# Patient Record
Sex: MALE | Race: WHITE | NOT HISPANIC OR LATINO | Employment: FULL TIME | URBAN - NONMETROPOLITAN AREA
[De-identification: names, ages, dates, MRNs, and addresses within clinical notes are randomized per-mention and may not be internally consistent; named-entity substitution may affect disease eponyms.]

---

## 2023-06-13 ENCOUNTER — OFFICE VISIT (OUTPATIENT)
Dept: URGENT CARE | Facility: CLINIC | Age: 58
End: 2023-06-13
Payer: COMMERCIAL

## 2023-06-13 VITALS
RESPIRATION RATE: 18 BRPM | HEART RATE: 98 BPM | BODY MASS INDEX: 34.82 KG/M2 | HEIGHT: 65 IN | DIASTOLIC BLOOD PRESSURE: 96 MMHG | WEIGHT: 209 LBS | SYSTOLIC BLOOD PRESSURE: 136 MMHG | TEMPERATURE: 98.6 F | OXYGEN SATURATION: 96 %

## 2023-06-13 DIAGNOSIS — R03.0 ELEVATED BLOOD PRESSURE READING: ICD-10-CM

## 2023-06-13 DIAGNOSIS — H10.13 ALLERGIC CONJUNCTIVITIS OF BOTH EYES: ICD-10-CM

## 2023-06-13 PROCEDURE — 3075F SYST BP GE 130 - 139MM HG: CPT | Performed by: PHYSICIAN ASSISTANT

## 2023-06-13 PROCEDURE — 3080F DIAST BP >= 90 MM HG: CPT | Performed by: PHYSICIAN ASSISTANT

## 2023-06-13 PROCEDURE — 99203 OFFICE O/P NEW LOW 30 MIN: CPT | Performed by: PHYSICIAN ASSISTANT

## 2023-06-13 RX ORDER — FLUTICASONE PROPIONATE 50 MCG
1 SPRAY, SUSPENSION (ML) NASAL DAILY
Qty: 16 G | Refills: 0 | Status: SHIPPED | OUTPATIENT
Start: 2023-06-13

## 2023-06-13 RX ORDER — CETIRIZINE HYDROCHLORIDE 10 MG/1
10 TABLET ORAL DAILY
Qty: 30 TABLET | Refills: 0 | Status: SHIPPED | OUTPATIENT
Start: 2023-06-13

## 2023-06-13 RX ORDER — OLOPATADINE HYDROCHLORIDE 1 MG/ML
1 SOLUTION/ DROPS OPHTHALMIC 2 TIMES DAILY
Qty: 5 ML | Refills: 1 | Status: SHIPPED | OUTPATIENT
Start: 2023-06-13 | End: 2023-08-30

## 2023-06-13 NOTE — PROGRESS NOTES
"Subjective:   Get Schilling is a 58 y.o. male who presents for Seasonal Allergies (Allergies x 1 month)     Patient presents with chief complaint of severe seasonal allergies with chief complaint of extremely watery and itchy eyes right greater than left.  He states he has similar symptoms annually around this time.  He also reports an allergy to dogs and pollen.  The itching of his right eye has become so severe he has noticed some redness to the upper eyelid.  He denies visual acuity changes.  He has taken occasional Benadryl but no other  allergy medications.  He denies shortness of breath or difficulty breathing.  Does report associated rhinitis.  No fevers or chills.        Medications:  This patient does not have an active medication from one of the medication groupers.    Allergies:             Erythromycin    Surgical History:         Past Surgical History:   Procedure Laterality Date    ORIF, FRACTURE, TIBIA  7/5/2014    Performed by Yuri Pearson M.D. at [facility]       Past Social Hx:  Get Schilling  reports that he has quit smoking. His smoking use included cigarettes. He smoked an average of .5 packs per day. He has never used smokeless tobacco. He reports current alcohol use of about 10.0 oz of alcohol per week. He reports that he does not use drugs.     Past Family Hx:   Get Schilling family history includes Heart Disease in his mother.       Problem list, medications, and allergies reviewed by myself today in Epic.     Objective:     BP (!) 136/96 (BP Location: Right arm, Patient Position: Sitting, BP Cuff Size: Adult)   Pulse 98   Temp 37 °C (98.6 °F) (Temporal)   Resp 18   Ht 1.651 m (5' 5\")   Wt 94.8 kg (209 lb)   SpO2 96%   BMI 34.78 kg/m²     Physical Exam  Vitals and nursing note reviewed.   Constitutional:       General: He is not in acute distress.     Appearance: Normal appearance. He is not ill-appearing or toxic-appearing.   HENT:      Head: Normocephalic.     "  Right Ear: Tympanic membrane has decreased mobility.      Left Ear: Tympanic membrane has decreased mobility.      Nose: Mucosal edema, congestion and rhinorrhea present.      Right Turbinates: Swollen and pale.      Left Turbinates: Swollen and pale.      Mouth/Throat:      Mouth: Mucous membranes are moist.      Pharynx: Posterior oropharyngeal erythema present. No oropharyngeal exudate.   Eyes:      General:         Right eye: No discharge or hordeolum.         Left eye: No discharge or hordeolum.      Extraocular Movements:      Right eye: Normal extraocular motion.      Conjunctiva/sclera:      Right eye: Right conjunctiva is injected. Chemosis present. No exudate or hemorrhage.     Left eye: Left conjunctiva is injected. No exudate or hemorrhage.     Pupils: Pupils are equal, round, and reactive to light.        Comments: Conjunctival injection and mild chemosis right greater than left.  There is significant erythema to the right upper eyelid, no chalazion or hordeolum.  No exudate noted.  Diffuse watering from eyes.   Cardiovascular:      Rate and Rhythm: Normal rate.      Pulses: Normal pulses.      Heart sounds: Normal heart sounds.   Pulmonary:      Effort: Pulmonary effort is normal. No respiratory distress.      Breath sounds: Normal breath sounds. No stridor. No wheezing, rhonchi or rales.   Skin:     General: Skin is warm.      Findings: No rash.   Neurological:      Mental Status: He is alert.         Assessment/Plan:     Diagnosis and Associated Orders:     1. Allergic conjunctivitis of both eyes  - olopatadine (PATANOL) 0.1 % ophthalmic solution; Administer 1 Drop into both eyes 2 times a day. For allergic conjunctivitis  Dispense: 5 mL; Refill: 1  - naphazoline-pheniramine (NAPHCON-A) 0.025-0.3 % ophthalmic solution; Administer 1 Drop into both eyes 4 times a day. Do not use longer than 2 weeks  Dispense: 15 mL; Refill: 0  - cetirizine (ZYRTEC ALLERGY) 10 MG Tab; Take 1 Tablet by mouth every day.   Dispense: 30 Tablet; Refill: 0  - fluticasone (FLONASE) 50 MCG/ACT nasal spray; Administer 1 Spray into affected nostril(S) every day.  Dispense: 16 g; Refill: 0  - hydrocortisone 0.5 % Cream; Apply twice/day no more than 2 weeks  Dispense: 15 g; Refill: 0    2. Elevated blood pressure reading        Comments/MDM:  Patient presents with allergic conjunctivitis.  Recommend nonsedating antihistamine and Flonase, neither of which he is using currently.  Recommend initiation of Patanol drops with short-term use of Naphcon-A.  May discontinue Naphcon-A within approximately 2 weeks.  Patanol will take slightly longer to be effective.  May use half percent hydrocortisone cream to eyelids no more than 2 weeks.  Monitor for signs of skin thinning or atrophy.  Ultimately if this is ineffective in treating him he can follow-up with his primary care physician.  He was requesting an injection of steroid however he really has not tried any other allergy symptom relief so I do believe this is a proper place to start.    Your blood pressure was elevated today urgent care.  Please measure and record your blood pressure 3 times over 2 weeks and keep notes.  If this is the first time this may or may not be cause for alarm.  A one-time elevated blood pressure reading does not mean that you need treatment.  The diagnosis of high blood pressure requires 2 separate blood pressure measurements above 140/90 on different days.  Your blood pressure should be checked when you are relaxed and have been sitting for about 10 minutes.  Injury, illness, emotional stress, caffeine, and some medicine such as decongestants may elevate blood pressure temporarily.  Treatment for hypertension includes both lifestyle changes and medications.  Weight loss and reducing dietary salt is beneficial.  Smoking cessation and exercise are helpful.  Avoid decongestants and recreational drugs that cause hypertension.  Do not drink alcohol in excess.  Seek immediate  medical care if you develop a severe headache, blurred or change in vision, confusion, unusual weakness or numbness, severe chest or abdominal pain, vomiting, breathing problems.    I personally reviewed prior external notes and test results pertinent to today's visit. Supportive care, natural history, differential diagnoses, and indications for immediate follow-up discussed. Return to clinic or go to ED if symptoms worsen or persist.  Red flag symptoms discussed.  Patient/Parent/Guardian voices understanding. Follow-up with your primary care provider in 3-5 days.  All side effects of medication discussed including allergic response, GI upset, tendon injury, rash, sedation etc    Please note that this dictation was created using voice recognition software. I have made a reasonable attempt to correct obvious errors, but I expect that there are errors of grammar and possibly content that I did not discover before finalizing the note.    This note was electronically signed by Juani De Paz PA-C

## 2023-08-30 ENCOUNTER — OFFICE VISIT (OUTPATIENT)
Dept: URGENT CARE | Facility: CLINIC | Age: 58
End: 2023-08-30
Payer: COMMERCIAL

## 2023-08-30 VITALS
SYSTOLIC BLOOD PRESSURE: 126 MMHG | RESPIRATION RATE: 16 BRPM | HEART RATE: 86 BPM | WEIGHT: 205 LBS | HEIGHT: 65 IN | OXYGEN SATURATION: 98 % | DIASTOLIC BLOOD PRESSURE: 84 MMHG | BODY MASS INDEX: 34.16 KG/M2 | TEMPERATURE: 98.6 F

## 2023-08-30 DIAGNOSIS — H57.89 IRRITATION OF BOTH EYES: ICD-10-CM

## 2023-08-30 DIAGNOSIS — H10.13 ALLERGIC CONJUNCTIVITIS OF BOTH EYES: ICD-10-CM

## 2023-08-30 DIAGNOSIS — J30.89 SEASONAL AND PERENNIAL ALLERGIC RHINITIS: ICD-10-CM

## 2023-08-30 DIAGNOSIS — J30.2 SEASONAL AND PERENNIAL ALLERGIC RHINITIS: ICD-10-CM

## 2023-08-30 PROCEDURE — 3079F DIAST BP 80-89 MM HG: CPT | Performed by: NURSE PRACTITIONER

## 2023-08-30 PROCEDURE — 99214 OFFICE O/P EST MOD 30 MIN: CPT | Performed by: NURSE PRACTITIONER

## 2023-08-30 PROCEDURE — 3074F SYST BP LT 130 MM HG: CPT | Performed by: NURSE PRACTITIONER

## 2023-08-30 RX ORDER — TRIAMCINOLONE ACETONIDE 40 MG/ML
40 INJECTION, SUSPENSION INTRA-ARTICULAR; INTRAMUSCULAR ONCE
Status: COMPLETED | OUTPATIENT
Start: 2023-08-30 | End: 2023-08-30

## 2023-08-30 RX ORDER — AZELASTINE HYDROCHLORIDE 0.5 MG/ML
1 SOLUTION/ DROPS OPHTHALMIC 2 TIMES DAILY
Qty: 6 ML | Refills: 0 | Status: SHIPPED | OUTPATIENT
Start: 2023-08-30

## 2023-08-30 RX ADMIN — TRIAMCINOLONE ACETONIDE 40 MG: 40 INJECTION, SUSPENSION INTRA-ARTICULAR; INTRAMUSCULAR at 15:51

## 2023-08-30 NOTE — PROGRESS NOTES
"Subjective:   Get Schilling is a pleasant 58 y.o. male who presents for Seasonal Allergies (Allergies )       HPI  Patient presents for evaluation of an approximate 3-month history of nasal congestion with rhinorrhea, itchy nose, itchy throat, and watery itchy eyes.  Patient was seen in urgent care 6/13 for same symptoms, at which time he began cetirizine, Patanol, with mild relief.  States that recent change in weather has made his allergy symptoms significantly worse.  Has had Kenalog injection in the past, tolerated it well.    ROS  All other systems are negative except as documented above within HPI.    MEDS:   Current Outpatient Medications:     olopatadine (PATANOL) 0.1 % ophthalmic solution, Administer 1 Drop into both eyes 2 times a day. For allergic conjunctivitis, Disp: 5 mL, Rfl: 1    naphazoline-pheniramine (NAPHCON-A) 0.025-0.3 % ophthalmic solution, Administer 1 Drop into both eyes 4 times a day. Do not use longer than 2 weeks, Disp: 15 mL, Rfl: 0    cetirizine (ZYRTEC ALLERGY) 10 MG Tab, Take 1 Tablet by mouth every day., Disp: 30 Tablet, Rfl: 0    fluticasone (FLONASE) 50 MCG/ACT nasal spray, Administer 1 Spray into affected nostril(S) every day., Disp: 16 g, Rfl: 0    hydrocortisone 0.5 % Cream, Apply twice/day no more than 2 weeks, Disp: 15 g, Rfl: 0  ALLERGIES:   Allergies   Allergen Reactions    Erythromycin Hives       Patient's PMH, SocHx, SurgHx, FamHx, Drug allergies and medications were reviewed.     Objective:   /84 (BP Location: Right arm, Patient Position: Sitting, BP Cuff Size: Adult)   Pulse 86   Temp 37 °C (98.6 °F) (Temporal)   Resp 16   Ht 1.651 m (5' 5\")   Wt 93 kg (205 lb)   SpO2 98%   BMI 34.11 kg/m²     Physical Exam  Vitals and nursing note reviewed.   Constitutional:       General: He is awake.      Appearance: Normal appearance. He is well-developed.   HENT:      Head: Normocephalic and atraumatic.      Right Ear: Tympanic membrane, ear canal and external ear " normal.      Left Ear: Tympanic membrane, ear canal and external ear normal.      Nose: Congestion and rhinorrhea present.      Mouth/Throat:      Mouth: Mucous membranes are moist.      Pharynx: Oropharynx is clear. Posterior oropharyngeal erythema present. No oropharyngeal exudate.   Eyes:      Extraocular Movements: Extraocular movements intact.      Conjunctiva/sclera: Conjunctivae normal.   Neck:      Trachea: Trachea and phonation normal.   Cardiovascular:      Rate and Rhythm: Normal rate and regular rhythm.      Pulses: Normal pulses.      Heart sounds: Normal heart sounds.   Pulmonary:      Effort: Pulmonary effort is normal.      Breath sounds: Normal breath sounds and air entry. No stridor. No wheezing, rhonchi or rales.   Musculoskeletal:         General: Normal range of motion.      Cervical back: Normal range of motion and neck supple.   Skin:     General: Skin is warm and dry.      Capillary Refill: Capillary refill takes less than 2 seconds.   Neurological:      Mental Status: He is alert and oriented to person, place, and time.   Psychiatric:         Mood and Affect: Mood normal.         Behavior: Behavior is cooperative.         Thought Content: Thought content normal.         Assessment/Plan:   Assessment    1. Seasonal and perennial allergic rhinitis  - triamcinolone acetonide (Kenalog-40) injection 40 mg  - azelastine (OPTIVAR) 0.05 % ophthalmic solution; Administer 1 Drop into both eyes 2 times a day.  Dispense: 6 mL; Refill: 0    2. Allergic conjunctivitis of both eyes    3. Irritation of both eyes      Vital signs stable at today's acute urgent care visit.  Kenalog given in clinic, patient tolerated well.  Begin medications as listed.    Advised the patient to follow-up with the primary care provider/urgent care if symptoms persist. All questions were encouraged and answered to the patient's satisfaction and understanding, and they agree to the plan of care.     This is an acute problem with  uncertain prognosis, medication management and instructions as well as management options were provided.  I personally reviewed prior external notes and test results pertinent to today and independently reviewed and interpreted all diagnostics, to include POC testing. Time spent evaluating this patient includes preparing for visit, counseling/education, exam, evaluation, obtaining history, and ordering lab/test/procedures.      Please note that this dictation was created using voice recognition software. I have made a reasonable attempt to correct obvious errors, but I expect that there are errors of grammar and possibly content that I did not discover before finalizing the note.

## 2024-05-15 ENCOUNTER — APPOINTMENT (OUTPATIENT)
Dept: URGENT CARE | Facility: CLINIC | Age: 59
End: 2024-05-15
Payer: COMMERCIAL

## 2024-05-16 ENCOUNTER — OFFICE VISIT (OUTPATIENT)
Dept: URGENT CARE | Facility: CLINIC | Age: 59
End: 2024-05-16
Payer: COMMERCIAL

## 2024-05-16 VITALS
SYSTOLIC BLOOD PRESSURE: 118 MMHG | HEART RATE: 60 BPM | OXYGEN SATURATION: 95 % | RESPIRATION RATE: 16 BRPM | WEIGHT: 195 LBS | DIASTOLIC BLOOD PRESSURE: 74 MMHG | HEIGHT: 65 IN | TEMPERATURE: 98.6 F | BODY MASS INDEX: 32.49 KG/M2

## 2024-05-16 DIAGNOSIS — J30.1 SEASONAL ALLERGIC RHINITIS DUE TO POLLEN: ICD-10-CM

## 2024-05-16 PROCEDURE — 99213 OFFICE O/P EST LOW 20 MIN: CPT | Performed by: NURSE PRACTITIONER

## 2024-05-16 PROCEDURE — 3074F SYST BP LT 130 MM HG: CPT | Performed by: NURSE PRACTITIONER

## 2024-05-16 PROCEDURE — 3078F DIAST BP <80 MM HG: CPT | Performed by: NURSE PRACTITIONER

## 2024-05-16 RX ORDER — TRIAMCINOLONE ACETONIDE 40 MG/ML
40 INJECTION, SUSPENSION INTRA-ARTICULAR; INTRAMUSCULAR ONCE
Status: COMPLETED | OUTPATIENT
Start: 2024-05-16 | End: 2024-05-16

## 2024-05-16 RX ADMIN — TRIAMCINOLONE ACETONIDE 40 MG: 40 INJECTION, SUSPENSION INTRA-ARTICULAR; INTRAMUSCULAR at 12:43

## 2024-05-16 NOTE — PROGRESS NOTES
Date: 05/16/24        Chief Complaint   Patient presents with    Other     Requesting a allergy shot         History of Present Illness: 58 y.o.  male presents to clinic with severe seasonal allergies.  Patient reports at season change he has significant allergic rhinitis and conjunctivitis secondary to pollen.  He has been taking daily Zyrtec with minimal improvement.  He also has significant redness and swelling around his eyes that he states is consistent with his previous allergic flares.  He denies any fevers or bodyaches.  He denies any vision changes or eye pain.      ROS:    No severe shortness of breath   No Cardiac like chest pain, as discussed   As otherwise stated in HPI    Medical/SX/ Social History:  Reviewed per chart    Pertinent Medications:    Current Outpatient Medications on File Prior to Visit   Medication Sig Dispense Refill    azelastine (OPTIVAR) 0.05 % ophthalmic solution Administer 1 Drop into both eyes 2 times a day. 6 mL 0    naphazoline-pheniramine (NAPHCON-A) 0.025-0.3 % ophthalmic solution Administer 1 Drop into both eyes 4 times a day. Do not use longer than 2 weeks 15 mL 0    cetirizine (ZYRTEC ALLERGY) 10 MG Tab Take 1 Tablet by mouth every day. 30 Tablet 0    fluticasone (FLONASE) 50 MCG/ACT nasal spray Administer 1 Spray into affected nostril(S) every day. 16 g 0    hydrocortisone 0.5 % Cream Apply twice/day no more than 2 weeks 15 g 0     No current facility-administered medications on file prior to visit.        Allergies:    Erythromycin     Problem list, medications, and allergies reviewed by myself today in Epic     Physical Exam:    Vitals:    05/16/24 1233   BP: 118/74   Pulse: (!) 120   Resp: 16   Temp: 37 °C (98.6 °F)   SpO2: 95%             Physical Exam  Constitutional:       General: He is awake.      Appearance: Normal appearance. He is not ill-appearing, toxic-appearing or diaphoretic.   HENT:      Head: Normocephalic and atraumatic.      Right Ear: Tympanic  membrane, ear canal and external ear normal.      Left Ear: Tympanic membrane, ear canal and external ear normal.      Nose: Mucosal edema and rhinorrhea present. Rhinorrhea is clear.      Right Turbinates: Swollen and pale.      Left Turbinates: Swollen and pale.      Mouth/Throat:      Lips: Pink.      Mouth: Mucous membranes are moist.      Pharynx: Pharyngeal swelling (cobblstoning) present. No oropharyngeal exudate.   Eyes:      General: Lids are normal. Gaze aligned appropriately. Allergic shiner present. No scleral icterus.     Extraocular Movements: Extraocular movements intact.      Conjunctiva/sclera:      Right eye: Right conjunctiva is injected. No chemosis, exudate or hemorrhage.     Left eye: Left conjunctiva is injected. No chemosis, exudate or hemorrhage.     Pupils: Pupils are equal, round, and reactive to light.   Cardiovascular:      Rate and Rhythm: Normal rate and regular rhythm.      Pulses:           Radial pulses are 2+ on the right side and 2+ on the left side.      Heart sounds: Normal heart sounds.   Pulmonary:      Effort: Pulmonary effort is normal.      Breath sounds: Normal breath sounds and air entry. No decreased breath sounds, wheezing, rhonchi or rales.   Musculoskeletal:      Right lower leg: No edema.      Left lower leg: No edema.   Lymphadenopathy:      Cervical: No cervical adenopathy.   Skin:     General: Skin is warm.      Capillary Refill: Capillary refill takes less than 2 seconds.      Coloration: Skin is not cyanotic or pale.   Neurological:      Mental Status: He is alert and oriented to person, place, and time.      Gait: Gait is intact.   Psychiatric:         Behavior: Behavior normal. Behavior is cooperative.            Medical Decision making and plan :  I personally reviewed prior external notes and test results pertinent to today's visit. Pt is clinically stable at today's acute urgent care visit.  Patient appears nontoxic with no acute distress noted. Appropriate  for outpatient care at this time.      Pleasant 58 y.o. male presented clinic with significant seasonal allergic rhinitis with allergic shiners and allergic conjunctivitis.  Reasonable to administer Kenalog shot as this has worked well for him in the past.  He has not received a Kenalog shot in almost a year.  Did discuss risk versus benefits of Kenalog shot.  He may continue to use Claritin Zyrtec or Benadryl.  Advised Zyrtec and Claritin will be less sedating.  Continue with allergic eyedrops as previously prescribed.    1. Seasonal allergic rhinitis due to pollen    - triamcinolone acetonide (Kenalog-40) injection 40 mg         Shared decision-making was utilized with patient for treatment plan. Medication discussed included indication for use and the potential benefits and side effects. Education was provided regarding the aforementioned assessments.  Differential Diagnosis, natural history, and supportive care discussed. All of the patient's questions were answered to their satisfaction at the time of discharge. Patient was encouraged to monitor symptoms closely. Those signs and symptoms which would warrant concern and mandate seeking a higher level of service through the emergency department discussed at length.  Patient stated agreement and understanding of this plan of care.    Disposition:  Home in stable condition       Voice Recognition Disclaimer:  Portions of this document were created using voice recognition software. The software does have a chance of producing errors of grammar and possibly content. I have made every reasonable attempt to correct obvious errors, but there may be errors of grammar and possibly content that I did not discover before finalizing the documentation.    EMILY Cisneros.

## 2024-05-24 ENCOUNTER — OFFICE VISIT (OUTPATIENT)
Dept: URGENT CARE | Facility: CLINIC | Age: 59
End: 2024-05-24
Payer: COMMERCIAL

## 2024-05-24 VITALS
TEMPERATURE: 98.6 F | HEIGHT: 65 IN | DIASTOLIC BLOOD PRESSURE: 76 MMHG | HEART RATE: 103 BPM | RESPIRATION RATE: 18 BRPM | OXYGEN SATURATION: 96 % | BODY MASS INDEX: 32.49 KG/M2 | SYSTOLIC BLOOD PRESSURE: 118 MMHG | WEIGHT: 195 LBS

## 2024-05-24 DIAGNOSIS — H10.13 ALLERGIC CONJUNCTIVITIS OF BOTH EYES: ICD-10-CM

## 2024-05-24 DIAGNOSIS — H10.9 BACTERIAL CONJUNCTIVITIS: ICD-10-CM

## 2024-05-24 PROCEDURE — 3074F SYST BP LT 130 MM HG: CPT | Performed by: PHYSICIAN ASSISTANT

## 2024-05-24 PROCEDURE — 3078F DIAST BP <80 MM HG: CPT | Performed by: PHYSICIAN ASSISTANT

## 2024-05-24 PROCEDURE — 99213 OFFICE O/P EST LOW 20 MIN: CPT | Performed by: PHYSICIAN ASSISTANT

## 2024-05-24 RX ORDER — MOXIFLOXACIN 5 MG/ML
1 SOLUTION/ DROPS OPHTHALMIC 3 TIMES DAILY
Qty: 2 ML | Refills: 0 | Status: SHIPPED | OUTPATIENT
Start: 2024-05-24 | End: 2024-05-31

## 2024-05-24 RX ORDER — CETIRIZINE HYDROCHLORIDE 10 MG/1
10 TABLET ORAL DAILY
Qty: 30 TABLET | Refills: 0 | Status: SHIPPED | OUTPATIENT
Start: 2024-05-24

## 2024-05-24 NOTE — PROGRESS NOTES
"Subjective:   Get Schilling is a 58 y.o. male who presents for Conjunctivitis (Left eye )     This is a very pleasant 58-year-old male with severe allergic conjunctivitis.  He has been seen in our urgent care many times with similar symptoms and seasonal allergic rhinitis as well as conjunctivitis.  He reports he received 40 mg of IM Kenalog on 516 which did help with allergic conjunctivitis.  He now has developed left eye redness associated with significant discharge matting and crusting.  The discharge is yellow-green.  He does not wear contacts.  He denies visual acuity changes.        Medications:  azelastine  cetirizine Tabs  fluticasone  hydrocortisone Crea  naphazoline-pheniramine    Allergies:             Erythromycin    Surgical History:         Past Surgical History:   Procedure Laterality Date    ORIF, FRACTURE, TIBIA  7/5/2014    Performed by Yuri Pearson M.D. at [facility]       Past Social Hx:  Get Schilling  reports that he has quit smoking. His smoking use included cigarettes. He has never used smokeless tobacco. He reports current alcohol use of about 10.0 oz of alcohol per week. He reports that he does not use drugs.     Past Family Hx:   Get Schilling family history includes Heart Disease in his mother.       Problem list, medications, and allergies reviewed by myself today in Epic.     Objective:     /76   Pulse (!) 103   Temp 37 °C (98.6 °F) (Temporal)   Resp 18   Ht 1.651 m (5' 5\")   Wt 88.5 kg (195 lb)   SpO2 96%   BMI 32.45 kg/m²     Physical Exam  Vitals and nursing note reviewed.   Constitutional:       General: He is not in acute distress.     Appearance: He is well-developed. He is not ill-appearing, toxic-appearing or diaphoretic.      Comments: This is a nontoxic-appearing child in no apparent distress   HENT:      Head: Normocephalic.      Nose: Nose normal.      Mouth/Throat:      Mouth: Mucous membranes are moist.   Eyes:      General: Lids are " normal. Vision grossly intact. Gaze aligned appropriately. No visual field deficit or scleral icterus.        Right eye: No discharge.         Left eye: Discharge present.     Extraocular Movements: Extraocular movements intact.      Right eye: Normal extraocular motion.      Left eye: Normal extraocular motion.      Conjunctiva/sclera:      Right eye: Right conjunctiva is not injected. No chemosis or hemorrhage.     Left eye: Left conjunctiva is injected. No chemosis or hemorrhage.     Pupils: Pupils are equal, round, and reactive to light. Pupils are equal.      Right eye: Pupil is reactive.      Left eye: Pupil is reactive.      Funduscopic exam:     Right eye: Red reflex present.         Left eye: Red reflex present.     Comments: Left eye with conjunctival injection, erythema, discharge.  Pupils equal react light.  EOM intact.   Cardiovascular:      Rate and Rhythm: Normal rate.      Pulses: Normal pulses.   Pulmonary:      Effort: Pulmonary effort is normal. No respiratory distress.   Musculoskeletal:         General: Normal range of motion.      Cervical back: Normal range of motion and neck supple.   Skin:     General: Skin is warm and dry.   Neurological:      Mental Status: He is alert and oriented to person, place, and time.         Assessment/Plan:     Diagnosis and Associated Orders:     1. Allergic conjunctivitis of both eyes  - Referral to Allergy  - moxifloxacin (VIGAMOX) 0.5 % Solution; Administer 1 Drop into both eyes 3 times a day for 7 days.  Dispense: 2 mL; Refill: 0  - cetirizine (ZYRTEC ALLERGY) 10 MG Tab; Take 1 Tablet by mouth every day.  Dispense: 30 Tablet; Refill: 0    2. Bacterial conjunctivitis        Comments/MDM:  Patient presents with documented allergic conjunctivitis which did respond to Kenalog now with bacterial conjunctivitis to left eye.  Antibiotic drops provided.  Warm compresses 3 times daily to affected eye.  Wash hands frequently.  May use tear free baby shampoo for  matting.  Follow-up if symptoms do not improve or worsen or you develop any visual acuity changes.  The inside of your eyelids and the covering of your eyeball have a clear membrane called the conjunctiva. The conjunctiva is susceptible to irritation from allergens, especially during hay fever season.  When your eyes are exposed to substances like pollen or mold spores, they may become red, itchy, and watery. These symptoms mean you have allergic conjunctivitis.  Allergic conjunctivitus is quite common and affects about one-fifth of the population.  Symptoms are not always symmetric and will be worsened by rubbing at your eyes.    Some of the substances that cause this reaction are:  - household dust  - pollen from trees and grass  - mold spores  - animal dander  - chemical scents (e.g., household detergents or perfume)    Treating allergic conjunctivitis involves a combination of prevention strategies and activities to ease your symptoms.     To minimize your exposure to allergens:  - close windows when the pollen count is high  - keep your home dust-free  - use an indoor air purifier  - avoid exposure to harsh chemicals, dyes, and perfumes    To ease your symptoms, avoid rubbing your eyes.   Applying a cool compress to your eyes can also help reduce inflammation and itching.    Medications can be used if prevention strategies are not enough:    -- an oral or over-the-counter antihistamine to reduce or block histamine release:   Cetirizine (Zyrtec)   Loratidine (Claritin)   Fexofenadine (Allegra)  -- anti-inflammatory eye drops (antihistamine/mast cell stabilizer), these take longer to work:   Zatidor (ketotifen)   Olopatadine (Pataday)  -- acute symptoms:  for immediate relief combination antihistamine/vasoconstrictor eye drops for < 2 weeks:     OTC Naphcon-A/Opcon-A    Conjunctivitis will go away if you treat the allergies properly  Nasal steroid spray can be more effective  Most seasonal allergies last about  one month    I personally reviewed prior external notes and test results pertinent to today's visit. Supportive care, natural history, differential diagnoses, and indications for immediate follow-up discussed. Return to clinic or go to ED if symptoms worsen or persist.  Red flag symptoms discussed.  Patient/Parent/Guardian voices understanding. Follow-up with your primary care provider in 3-5 days.  All side effects of medication discussed including allergic response, GI upset, tendon injury, rash, sedation etc    Please note that this dictation was created using voice recognition software. I have made a reasonable attempt to correct obvious errors, but I expect that there are errors of grammar and possibly content that I did not discover before finalizing the note.    This note was electronically signed by Juani De Paz PA-C

## 2024-05-30 ASSESSMENT — VISUAL ACUITY: OU: 1
